# Patient Record
Sex: MALE | Race: WHITE
[De-identification: names, ages, dates, MRNs, and addresses within clinical notes are randomized per-mention and may not be internally consistent; named-entity substitution may affect disease eponyms.]

---

## 2021-10-01 ENCOUNTER — HOSPITAL ENCOUNTER (EMERGENCY)
Dept: HOSPITAL 41 - JD.ED | Age: 66
Discharge: LEFT BEFORE DISCHARGE | End: 2021-10-01
Payer: MEDICAID

## 2021-10-01 DIAGNOSIS — Z53.21: Primary | ICD-10-CM

## 2021-10-02 ENCOUNTER — HOSPITAL ENCOUNTER (EMERGENCY)
Dept: HOSPITAL 41 - JD.ED | Age: 66
LOS: 1 days | Discharge: HOME | End: 2021-10-03
Payer: MEDICAID

## 2021-10-02 DIAGNOSIS — Z01.83: Primary | ICD-10-CM

## 2021-10-02 DIAGNOSIS — D64.9: ICD-10-CM

## 2021-10-02 PROCEDURE — 85025 COMPLETE CBC W/AUTO DIFF WBC: CPT

## 2021-10-02 PROCEDURE — 86900 BLOOD TYPING SEROLOGIC ABO: CPT

## 2021-10-02 PROCEDURE — 36415 COLL VENOUS BLD VENIPUNCTURE: CPT

## 2021-10-02 PROCEDURE — 86922 COMPATIBILITY TEST ANTIGLOB: CPT

## 2021-10-02 PROCEDURE — 86850 RBC ANTIBODY SCREEN: CPT

## 2021-10-02 PROCEDURE — 99284 EMERGENCY DEPT VISIT MOD MDM: CPT

## 2021-10-02 PROCEDURE — 86901 BLOOD TYPING SEROLOGIC RH(D): CPT

## 2021-10-02 PROCEDURE — 36430 TRANSFUSION BLD/BLD COMPNT: CPT

## 2021-10-02 PROCEDURE — P9016 RBC LEUKOCYTES REDUCED: HCPCS

## 2021-10-02 NOTE — EDM.PDOC
ED HPI GENERAL MEDICAL PROBLEM





- General


Chief Complaint: General


Stated Complaint: POSS BLOOD TRANSFUSION


Time Seen by Provider: 10/02/21 18:06


Source of Information: Reports: Patient, Old Records, RN Notes Reviewed


History Limitations: Reports: No Limitations





- History of Present Illness


INITIAL COMMENTS - FREE TEXT/NARRATIVE: 





Patient is a 66-year-old male who presents to the ER for a possible blood 

transfusion.  The patient is a dialysis patient of Dr. Emmanuel.  His hemoglobin

yesterday was at 5.8.  He presented to the ER last night for blood transfusion 

but we were extremely busy and he left at that time.  He presents again today 

because he believes that he needs a blood transfusion.  His last blood 

transfusion was on 9/9/2021.  For which she received 2 units of O+ blood.  

Patient is not having any fevers or chills, cough or shortness of breath, 

nausea/vomiting/diarrhea.  States he is not feeling dizzy or lightheaded.  Blood

pressure is a little bit low at 98/65.  But he states he feels fine otherwise.  

Patient has a general sallow appearance.





- Related Data


                                    Allergies











Allergy/AdvReac Type Severity Reaction Status Date / Time


 


No Known Allergies Allergy   Verified 10/02/21 18:14














ED ROS GENERAL





- Review of Systems


Review Of Systems: Comprehensive ROS is negative, except as noted in HPI.





ED EXAM, GENERAL





- Physical Exam


Exam: See Below


Exam Limited By: No Limitations


General Appearance: Alert, WD/WN, No Apparent Distress


Respiratory/Chest: No Respiratory Distress, Lungs Clear, Normal Breath Sounds, 

No Accessory Muscle Use, Chest Non-Tender


Cardiovascular: Normal Peripheral Pulses, Regular Rate, Rhythm, No Edema


Peripheral Pulses: 2+: Radial (L), Radial (R)


Extremities: Normal Inspection, Normal Capillary Refill


Neurological: Alert, Oriented, Normal Cognition, No Motor/Sensory Deficits


Psychiatric: Normal Affect, Normal Mood


Skin Exam: Warm, Dry, Intact, No Rash, Pallor (generalized-somewhat sallow)





Course





- Vital Signs


Last Recorded V/S: 


                                Last Vital Signs











Temp  97.5 F   10/02/21 21:26


 


Pulse  83   10/02/21 21:26


 


Resp  18   10/02/21 21:26


 


BP  101/60   10/02/21 21:26


 


Pulse Ox  90 L  10/02/21 18:11














- Orders/Labs/Meds


Orders: 


                               Active Orders 24 hr











 Category Date Time Status


 


 Peripheral IV Care [RC] .AS DIRECTED Care  10/02/21 18:08 Active


 


 RED BLOOD CELLS LP [BBK] Stat Lab  10/02/21 18:52 Results


 


 TYPE AND SCREEN [BBK] Stat Lab  10/02/21 18:52 Results


 


 Sodium Chloride 0.9% [Saline Flush] Med  10/02/21 18:08 Active





 10 ml FLUSH ASDIRECTED PRN   


 


 Peripheral IV Insertion Adult [OM.PC] Routine Oth  10/02/21 18:08 Ordered


 


 Transfuse PRBC [Transfuse Red Blood Cells] [COMM] Stat Oth  10/02/21 19:07 Ord

ered








                                Medication Orders





Sodium Chloride (Sodium Chloride 0.9% 10 Ml Syringe)  10 ml FLUSH ASDIRECTED PRN


   PRN Reason: Keep Vein Open








Labs: 


                                Laboratory Tests











  10/02/21 10/02/21 Range/Units





  18:52 18:52 


 


WBC  4.70   (4.23-9.07)  K/mm3


 


RBC  2.16 L   (4.63-6.08)  M/mm3


 


Hgb  6.3 L*   (13.7-17.5)  gm/dl


 


Hct  20.7 L   (40.1-51.0)  %


 


MCV  95.8 H   (79.0-92.2)  fl


 


MCH  29.2   (25.7-32.2)  pg


 


MCHC  30.4 L   (32.2-35.5)  g/dl


 


RDW Std Deviation  55.8 H   (35.1-43.9)  fL


 


Plt Count  186   (163-337)  K/mm3


 


MPV  8.9 L   (9.4-12.3)  fl


 


Neut % (Auto)  55.7   (34.0-67.9)  %


 


Lymph % (Auto)  25.3   (21.8-53.1)  %


 


Mono % (Auto)  15.7 H   (5.3-12.2)  %


 


Eos % (Auto)  2.3   (0.8-7.0)  


 


Baso % (Auto)  0.6   (0.1-1.2)  %


 


Neut # (Auto)  2.61   (1.78-5.38)  K/mm3


 


Lymph # (Auto)  1.19 L   (1.32-3.57)  K/mm3


 


Mono # (Auto)  0.74   (0.30-0.82)  K/mm3


 


Eos # (Auto)  0.11   (0.04-0.54)  K/mm3


 


Baso # (Auto)  0.03   (0.01-0.08)  K/mm3


 


Blood Type   O POSITIVE  


 


Gel Antibody Screen   Negative  


 


Crossmatch   See Detail  











Meds: 


Medications











Generic Name Dose Route Start Last Admin





  Trade Name Fredelonte  PRN Reason Stop Dose Admin


 


Sodium Chloride  10 ml  10/02/21 18:08 





  Sodium Chloride 0.9% 10 Ml Syringe  FLUSH  





  ASDIRECTED PRN  





  Keep Vein Open  














Discontinued Medications














Generic Name Dose Route Start Last Admin





  Trade Name Freq  PRN Reason Stop Dose Admin


 


Sodium Chloride  Confirm  10/02/21 20:42 





  Normal Saline  Administered  10/02/21 20:43 





  Dose  





  500 mls @ as directed  





  .ROUTE  





  .K-MED ONE  














- Re-Assessments/Exams


Free Text/Narrative Re-Assessment/Exam: 





10/02/21 18:18


Patient presents to the ER for evaluation of a possible blood transfusion.  We 

will go ahead get IV started, do type and screen and a CBC for ongoing 

management.  Patient will likely necessitate 2 units of blood.





10/02/21 19:10


Patient's hemoglobin is 6.3, have ordered for 2 units to be infused at this 

time.





Departure





- Departure


Time of Disposition: 21:49


Disposition: Home, Self-Care 01


Condition: Good


Clinical Impression: 


 Low hemoglobin, Encounter for blood transfusion








- Discharge Information


*PRESCRIPTION DRUG MONITORING PROGRAM REVIEWED*: No


*COPY OF PRESCRIPTION DRUG MONITORING REPORT IN PATIENT ROSELIA: No


Referrals: 


Yessica Guzman, NP [Primary Care Provider] - 


Forms:  ED Department Discharge


Additional Instructions: 


You were evaluated in the ER today because you had a low hemoglobin.





You did receive 2 units of blood at today's visit.





Call your provider Monday morning for ongoing management, and please attend your

dialysis appointments as previously scheduled.





Do not hesitate to return to the ER at any time if symptoms change or worsen.





Sepsis Event Note (ED)





- Evaluation


Sepsis Screening Result: No Definite Risk





- Focused Exam


Vital Signs: 


                                   Vital Signs











  Temp Temp Pulse Resp BP Pulse Ox


 


 10/02/21 21:26  97.5 F   83  18  101/60 


 


 10/02/21 21:24  97.3 F   83  16  93/64 


 


 10/02/21 18:11   98.5 F  102 H  16  98/65  90 L














- My Orders


Last 24 Hours: 


My Active Orders





10/02/21 18:08


Peripheral IV Care [RC] .AS DIRECTED 


Sodium Chloride 0.9% [Saline Flush]   10 ml FLUSH ASDIRECTED PRN 


Peripheral IV Insertion Adult [OM.PC] Routine 





10/02/21 18:52


RED BLOOD CELLS LP [BBK] Stat 


TYPE AND SCREEN [BBK] Stat 





10/02/21 19:07


Transfuse PRBC [Transfuse Red Blood Cells] [COMM] Stat 














- Assessment/Plan


Last 24 Hours: 


My Active Orders





10/02/21 18:08


Peripheral IV Care [RC] .AS DIRECTED 


Sodium Chloride 0.9% [Saline Flush]   10 ml FLUSH ASDIRECTED PRN 


Peripheral IV Insertion Adult [OM.PC] Routine 





10/02/21 18:52


RED BLOOD CELLS LP [BBK] Stat 


TYPE AND SCREEN [BBK] Stat 





10/02/21 19:07


Transfuse PRBC [Transfuse Red Blood Cells] [COMM] Stat